# Patient Record
Sex: MALE | Race: WHITE | NOT HISPANIC OR LATINO | Employment: FULL TIME | URBAN - METROPOLITAN AREA
[De-identification: names, ages, dates, MRNs, and addresses within clinical notes are randomized per-mention and may not be internally consistent; named-entity substitution may affect disease eponyms.]

---

## 2023-02-07 PROBLEM — R10.13 EPIGASTRIC PAIN: Status: ACTIVE | Noted: 2023-02-07

## 2023-02-07 PROBLEM — I69.320 APHASIA FOLLOWING CEREBROVASCULAR ACCIDENT (CVA): Status: ACTIVE | Noted: 2023-02-07

## 2023-02-07 PROBLEM — R41.841 COGNITIVE COMMUNICATION DEFICIT: Status: ACTIVE | Noted: 2023-02-07

## 2023-02-07 PROBLEM — R76.8 ANA POSITIVE: Status: ACTIVE | Noted: 2023-02-07

## 2023-02-07 PROBLEM — R41.89 IMPAIRED COGNITION: Status: ACTIVE | Noted: 2023-02-07

## 2023-02-07 PROBLEM — Q21.12 PFO (PATENT FORAMEN OVALE) (HHS-HCC): Status: ACTIVE | Noted: 2023-02-07

## 2023-02-07 PROBLEM — I63.9 CVA (CEREBRAL VASCULAR ACCIDENT) (MULTI): Status: ACTIVE | Noted: 2023-02-07

## 2023-02-07 PROBLEM — R53.1 LACK OF STRENGTH: Status: ACTIVE | Noted: 2023-02-07

## 2023-02-07 RX ORDER — ATORVASTATIN CALCIUM 80 MG/1
1 TABLET, FILM COATED ORAL NIGHTLY
COMMUNITY
Start: 2022-08-08 | End: 2023-09-21 | Stop reason: WASHOUT

## 2023-02-07 RX ORDER — CLOPIDOGREL BISULFATE 75 MG/1
1 TABLET ORAL DAILY
COMMUNITY
Start: 2022-09-20 | End: 2023-09-21 | Stop reason: ALTCHOICE

## 2023-02-07 RX ORDER — NAPROXEN SODIUM 220 MG/1
1 TABLET, FILM COATED ORAL DAILY
COMMUNITY
Start: 2022-08-08

## 2023-03-19 PROBLEM — R41.89 IMPAIRED COGNITION: Status: RESOLVED | Noted: 2023-02-07 | Resolved: 2023-03-19

## 2023-03-19 PROBLEM — R41.841 COGNITIVE COMMUNICATION DEFICIT: Status: RESOLVED | Noted: 2023-02-07 | Resolved: 2023-03-19

## 2023-03-19 PROBLEM — R53.1 LACK OF STRENGTH: Status: RESOLVED | Noted: 2023-02-07 | Resolved: 2023-03-19

## 2023-03-19 NOTE — PATIENT INSTRUCTIONS
Thank you for choosing [facility] for your healthcare.   As always if you have any questions or concerns please do not hesitate to call our office at [phone] or through [employer].    Have a great day!  Lori Xavier, DO

## 2023-03-20 ENCOUNTER — OFFICE VISIT (OUTPATIENT)
Dept: PRIMARY CARE | Facility: CLINIC | Age: 48
End: 2023-03-20
Payer: COMMERCIAL

## 2023-03-20 VITALS
TEMPERATURE: 96.8 F | HEIGHT: 76 IN | SYSTOLIC BLOOD PRESSURE: 135 MMHG | WEIGHT: 182 LBS | BODY MASS INDEX: 22.16 KG/M2 | RESPIRATION RATE: 16 BRPM | HEART RATE: 76 BPM | DIASTOLIC BLOOD PRESSURE: 79 MMHG | OXYGEN SATURATION: 99 %

## 2023-03-20 DIAGNOSIS — I63.81 CEREBROVASCULAR ACCIDENT (CVA) OF LEFT THALAMUS (MULTI): Primary | ICD-10-CM

## 2023-03-20 DIAGNOSIS — Q21.12 PFO (PATENT FORAMEN OVALE) (HHS-HCC): ICD-10-CM

## 2023-03-20 PROBLEM — I69.320 APHASIA FOLLOWING CEREBROVASCULAR ACCIDENT (CVA): Status: RESOLVED | Noted: 2023-02-07 | Resolved: 2023-03-20

## 2023-03-20 PROBLEM — R10.13 EPIGASTRIC PAIN: Status: RESOLVED | Noted: 2023-02-07 | Resolved: 2023-03-20

## 2023-03-20 PROCEDURE — 1036F TOBACCO NON-USER: CPT | Performed by: FAMILY MEDICINE

## 2023-03-20 PROCEDURE — 99213 OFFICE O/P EST LOW 20 MIN: CPT | Performed by: FAMILY MEDICINE

## 2023-03-20 ASSESSMENT — PAIN SCALES - GENERAL: PAINLEVEL: 0-NO PAIN

## 2023-03-20 NOTE — PROGRESS NOTES
"Subjective   Patient ID: Anjel Rodriguez is a 47 y.o. male who presents for Leg Injury (Was bruised now there is a lump no injury noticed/Has appt with Cardiology soon) and Cerebrovascular Accident (8/2022).      In addition to that documented in the HPI above, the additional ROS was obtained:  Constitutional: Denies fevers or chills  Eyes: Denies vision changes  ENMT: Denies trouble swallowing  Cardiovascular: Denies chest pain or heart racing  Respiratory: Denies SOB or cough    Current Outpatient Medications   Medication Sig Dispense Refill    aspirin 81 mg chewable tablet Chew 1 tablet (81 mg) once daily.      atorvastatin (Lipitor) 80 mg tablet Take 1 tablet (80 mg) by mouth once daily at bedtime.      clopidogrel (Plavix) 75 mg tablet Take 1 tablet (75 mg) by mouth once daily.       No current facility-administered medications for this visit.     Past Surgical History:   Procedure Laterality Date    MR HEAD ANGIO WO IV CONTRAST  8/4/2022    MR HEAD ANGIO WO IV CONTRAST 8/4/2022 [facility] CLINICAL LEGACY    MR NECK ANGIO WO IV CONTRAST  8/4/2022    MR NECK ANGIO WO IV CONTRAST 8/4/2022 [facility] CLINICAL LEGACY    OTHER SURGICAL HISTORY  03/07/2019    Circumcision     Family History   Problem Relation Name Age of Onset    Dementia Mother      Colon cancer Father        Social History     Tobacco Use    Smoking status: Never    Smokeless tobacco: Never   Vaping Use    Vaping status: Never Used     Passive vaping exposure: Yes   Substance Use Topics    Alcohol use: Never    Drug use: Never        Objective     Visit Vitals  /79 (BP Location: Right arm, Patient Position: Sitting, BP Cuff Size: Adult)   Pulse 76   Temp 36 °C (96.8 °F) (Temporal)   Resp 16   Ht 1.93 m (6' 4\")   Wt 82.6 kg (182 lb)   SpO2 99%   BMI 22.15 kg/m²   Smoking Status Never   BSA 2.1 m²        Constitutional: Well nourished, well developed, appears stated age  Eyes: no scleral icterus, no conjunctival injection  Ears: normal external auditory " canal, no retraction or bulging of tympanic membranes  Neck: no thyromegaly  Cardiovascular: regular rate and rhythm, no leg edema  Respiratory: normal respiratory effort, clear to auscultation bilaterally  Musculoskeletal: No gross deformities appreciated  Skin: Warm, dry. No rashes. Fading bruise on right inner thigh with small hematoma - discussed warm compress  Neurologic: Alert, CNs II-XII grossly intact..  Psych: Appropriate mood and affect.        Assessment/Plan   Problem List Items Addressed This Visit          Nervous    Cerebrovascular accident (CVA) of left thalamus (CMS/HCC) - Primary     Continue atorvastatin and Plavix         Relevant Orders    Lipid Panel    Comprehensive Metabolic Panel       Circulatory    PFO (patent foramen ovale)     Has follow up with cardiology in the next few weeks            Follow up 6 months for annual physical.    Virginia Factor, DO

## 2023-03-24 PROBLEM — R10.13 EPIGASTRIC PAIN: Status: ACTIVE | Noted: 2023-03-24

## 2023-03-24 PROBLEM — R41.89 IMPAIRED COGNITION: Status: ACTIVE | Noted: 2023-03-24

## 2023-03-24 PROBLEM — R53.1 LACK OF STRENGTH: Status: ACTIVE | Noted: 2023-03-24

## 2023-03-24 PROBLEM — I69.320 APHASIA FOLLOWING CEREBROVASCULAR ACCIDENT (CVA): Status: ACTIVE | Noted: 2023-03-24

## 2023-03-24 PROBLEM — I63.9 CVA (CEREBRAL VASCULAR ACCIDENT) (MULTI): Status: ACTIVE | Noted: 2023-03-24

## 2023-03-24 PROBLEM — R41.841 COGNITIVE COMMUNICATION DEFICIT: Status: ACTIVE | Noted: 2023-03-24

## 2023-08-04 ENCOUNTER — LAB (OUTPATIENT)
Dept: LAB | Facility: LAB | Age: 48
End: 2023-08-04
Payer: COMMERCIAL

## 2023-08-04 DIAGNOSIS — I63.81 CEREBROVASCULAR ACCIDENT (CVA) OF LEFT THALAMUS (MULTI): ICD-10-CM

## 2023-08-04 LAB
ALANINE AMINOTRANSFERASE (SGPT) (U/L) IN SER/PLAS: 15 U/L (ref 10–52)
ALBUMIN (G/DL) IN SER/PLAS: 4.7 G/DL (ref 3.4–5)
ALKALINE PHOSPHATASE (U/L) IN SER/PLAS: 59 U/L (ref 33–120)
ANION GAP IN SER/PLAS: 9 MMOL/L (ref 10–20)
ASPARTATE AMINOTRANSFERASE (SGOT) (U/L) IN SER/PLAS: 14 U/L (ref 9–39)
BILIRUBIN TOTAL (MG/DL) IN SER/PLAS: 1 MG/DL (ref 0–1.2)
CALCIUM (MG/DL) IN SER/PLAS: 9.6 MG/DL (ref 8.6–10.3)
CARBON DIOXIDE, TOTAL (MMOL/L) IN SER/PLAS: 29 MMOL/L (ref 21–32)
CHLORIDE (MMOL/L) IN SER/PLAS: 106 MMOL/L (ref 98–107)
CHOLESTEROL (MG/DL) IN SER/PLAS: 175 MG/DL (ref 0–199)
CHOLESTEROL IN HDL (MG/DL) IN SER/PLAS: 59.6 MG/DL
CHOLESTEROL/HDL RATIO: 2.9
CREATININE (MG/DL) IN SER/PLAS: 1.22 MG/DL (ref 0.5–1.3)
GFR MALE: 74 ML/MIN/1.73M2
GLUCOSE (MG/DL) IN SER/PLAS: 90 MG/DL (ref 74–99)
LDL: 104 MG/DL (ref 0–99)
POTASSIUM (MMOL/L) IN SER/PLAS: 4.8 MMOL/L (ref 3.5–5.3)
PROTEIN TOTAL: 7.1 G/DL (ref 6.4–8.2)
SODIUM (MMOL/L) IN SER/PLAS: 139 MMOL/L (ref 136–145)
TRIGLYCERIDE (MG/DL) IN SER/PLAS: 59 MG/DL (ref 0–149)
UREA NITROGEN (MG/DL) IN SER/PLAS: 16 MG/DL (ref 6–23)
VLDL: 12 MG/DL (ref 0–40)

## 2023-08-04 PROCEDURE — 80053 COMPREHEN METABOLIC PANEL: CPT

## 2023-08-04 PROCEDURE — 36415 COLL VENOUS BLD VENIPUNCTURE: CPT

## 2023-08-04 PROCEDURE — 80061 LIPID PANEL: CPT

## 2023-09-21 ENCOUNTER — OFFICE VISIT (OUTPATIENT)
Dept: PRIMARY CARE | Facility: CLINIC | Age: 48
End: 2023-09-21
Payer: COMMERCIAL

## 2023-09-21 VITALS
BODY MASS INDEX: 20.95 KG/M2 | SYSTOLIC BLOOD PRESSURE: 116 MMHG | WEIGHT: 172 LBS | RESPIRATION RATE: 16 BRPM | TEMPERATURE: 97.6 F | OXYGEN SATURATION: 98 % | HEART RATE: 61 BPM | DIASTOLIC BLOOD PRESSURE: 77 MMHG | HEIGHT: 76 IN

## 2023-09-21 DIAGNOSIS — Z00.00 ANNUAL PHYSICAL EXAM: Primary | ICD-10-CM

## 2023-09-21 DIAGNOSIS — Z12.11 SCREENING FOR COLON CANCER: ICD-10-CM

## 2023-09-21 PROBLEM — I63.9 CVA (CEREBRAL VASCULAR ACCIDENT) (MULTI): Status: RESOLVED | Noted: 2023-03-24 | Resolved: 2023-09-21

## 2023-09-21 PROBLEM — R41.89 IMPAIRED COGNITION: Status: RESOLVED | Noted: 2023-03-24 | Resolved: 2023-09-21

## 2023-09-21 PROBLEM — R41.841 COGNITIVE COMMUNICATION DEFICIT: Status: RESOLVED | Noted: 2023-03-24 | Resolved: 2023-09-21

## 2023-09-21 PROBLEM — Q21.12 PFO (PATENT FORAMEN OVALE) (HHS-HCC): Status: RESOLVED | Noted: 2023-02-07 | Resolved: 2023-09-21

## 2023-09-21 PROBLEM — R10.13 EPIGASTRIC PAIN: Status: RESOLVED | Noted: 2023-03-24 | Resolved: 2023-09-21

## 2023-09-21 PROBLEM — R53.1 LACK OF STRENGTH: Status: RESOLVED | Noted: 2023-03-24 | Resolved: 2023-09-21

## 2023-09-21 PROBLEM — I69.320 APHASIA FOLLOWING CEREBROVASCULAR ACCIDENT (CVA): Status: RESOLVED | Noted: 2023-03-24 | Resolved: 2023-09-21

## 2023-09-21 PROCEDURE — 1036F TOBACCO NON-USER: CPT | Performed by: FAMILY MEDICINE

## 2023-09-21 PROCEDURE — 99396 PREV VISIT EST AGE 40-64: CPT | Performed by: FAMILY MEDICINE

## 2023-09-21 SDOH — ECONOMIC STABILITY: FOOD INSECURITY: WITHIN THE PAST 12 MONTHS, YOU WORRIED THAT YOUR FOOD WOULD RUN OUT BEFORE YOU GOT MONEY TO BUY MORE.: NEVER TRUE

## 2023-09-21 SDOH — ECONOMIC STABILITY: FOOD INSECURITY: WITHIN THE PAST 12 MONTHS, THE FOOD YOU BOUGHT JUST DIDN'T LAST AND YOU DIDN'T HAVE MONEY TO GET MORE.: NEVER TRUE

## 2023-09-21 ASSESSMENT — PATIENT HEALTH QUESTIONNAIRE - PHQ9
SUM OF ALL RESPONSES TO PHQ9 QUESTIONS 1 & 2: 0
1. LITTLE INTEREST OR PLEASURE IN DOING THINGS: NOT AT ALL
2. FEELING DOWN, DEPRESSED OR HOPELESS: NOT AT ALL

## 2023-09-21 ASSESSMENT — LIFESTYLE VARIABLES
HOW OFTEN DO YOU HAVE A DRINK CONTAINING ALCOHOL: NEVER
HOW MANY STANDARD DRINKS CONTAINING ALCOHOL DO YOU HAVE ON A TYPICAL DAY: PATIENT DOES NOT DRINK
SKIP TO QUESTIONS 9-10: 1
AUDIT-C TOTAL SCORE: 0
HOW OFTEN DO YOU HAVE SIX OR MORE DRINKS ON ONE OCCASION: NEVER

## 2023-09-21 NOTE — PROGRESS NOTES
"Subjective   Patient ID: Anjel Rodriguez is a 47 y.o. male who presents for Follow-up (No new health concerns. Would like to go over lab work he had done).  HPI    In addition to that documented in the HPI above, the additional ROS was obtained:  Constitutional: Denies fevers or chills  Eyes: Denies vision changes  ENMT: Denies trouble swallowing  Cardiovascular: Denies chest pain or heart racing  Respiratory: Denies SOB or cough  Gastrointestinal: Denies nausea, vomiting, diarrhea or constipation  Musculoskeletal: Denies joint pain or joint swelling  Neuro: denies frequent headaches or dizziness    Current Outpatient Medications   Medication Sig Dispense Refill    aspirin 81 mg chewable tablet Chew 1 tablet (81 mg) once daily.       No current facility-administered medications for this visit.       Objective     Visit Vitals  /77 (BP Location: Right arm, Patient Position: Sitting, BP Cuff Size: Adult)   Pulse 61   Temp 36.4 °C (97.6 °F)   Resp 16   Ht 1.93 m (6' 4\")   Wt 78 kg (172 lb)   SpO2 98%   BMI 20.94 kg/m²   Smoking Status Never   BSA 2.04 m²        Constitutional: Well nourished, well developed, appears stated age  Eyes: no scleral icterus, no conjunctival injection  Ears: normal external auditory canal, no retraction or bulging of tympanic membranes  Neck: no thyromegaly  Cardiovascular: regular rate and rhythm, no leg edema  Respiratory: normal respiratory effort, clear to auscultation bilaterally  Musculoskeletal: No gross deformities appreciated  Skin: Warm, dry. No rashes  Neurologic: Alert, CNs II-XII grossly intact..  Psych: Appropriate mood and affect.        Assessment/Plan   Problem List Items Addressed This Visit    None  Visit Diagnoses       Annual physical exam    -  Primary    Screening labs reviewed with patient  Screening colonoscopy ordered    Screening for colon cancer        Relevant Orders    Colonoscopy Screening          All pertinent lab work and results were reviewed with " patient.     Follow up yearly and prn.    Lori Xavier DO

## 2023-09-21 NOTE — PATIENT INSTRUCTIONS
Thank you for choosing [facility] for your healthcare.   As always if you have any questions or concerns please do not hesitate to call our office at [phone] or through [employer].    Have a great day!  Lori Xavier, DO

## 2023-11-13 ENCOUNTER — PREP FOR PROCEDURE (OUTPATIENT)
Dept: GASTROENTEROLOGY | Facility: HOSPITAL | Age: 48
End: 2023-11-13
Payer: COMMERCIAL

## 2023-11-18 ENCOUNTER — PREP FOR PROCEDURE (OUTPATIENT)
Dept: GASTROENTEROLOGY | Facility: CLINIC | Age: 48
End: 2023-11-18
Payer: COMMERCIAL

## 2023-11-18 RX ORDER — SODIUM CHLORIDE 9 MG/ML
20 INJECTION, SOLUTION INTRAVENOUS CONTINUOUS
Status: CANCELLED | OUTPATIENT
Start: 2023-11-18

## 2023-11-20 ENCOUNTER — ANESTHESIA (OUTPATIENT)
Dept: GASTROENTEROLOGY | Facility: HOSPITAL | Age: 48
End: 2023-11-20
Payer: COMMERCIAL

## 2023-11-20 ENCOUNTER — ANESTHESIA EVENT (OUTPATIENT)
Dept: GASTROENTEROLOGY | Facility: HOSPITAL | Age: 48
End: 2023-11-20
Payer: COMMERCIAL

## 2023-11-20 ENCOUNTER — HOSPITAL ENCOUNTER (OUTPATIENT)
Dept: GASTROENTEROLOGY | Facility: HOSPITAL | Age: 48
Discharge: HOME | End: 2023-11-20
Payer: COMMERCIAL

## 2023-11-20 VITALS
HEIGHT: 76 IN | TEMPERATURE: 97.9 F | WEIGHT: 175 LBS | SYSTOLIC BLOOD PRESSURE: 114 MMHG | BODY MASS INDEX: 21.31 KG/M2 | DIASTOLIC BLOOD PRESSURE: 71 MMHG | HEART RATE: 52 BPM | RESPIRATION RATE: 16 BRPM | OXYGEN SATURATION: 100 %

## 2023-11-20 DIAGNOSIS — Z12.11 SCREENING FOR COLON CANCER: ICD-10-CM

## 2023-11-20 PROCEDURE — 7100000009 HC PHASE TWO TIME - INITIAL BASE CHARGE: Performed by: INTERNAL MEDICINE

## 2023-11-20 PROCEDURE — 3700000001 HC GENERAL ANESTHESIA TIME - INITIAL BASE CHARGE: Performed by: INTERNAL MEDICINE

## 2023-11-20 PROCEDURE — G0121 COLON CA SCRN NOT HI RSK IND: HCPCS | Performed by: INTERNAL MEDICINE

## 2023-11-20 PROCEDURE — 45378 DIAGNOSTIC COLONOSCOPY: CPT | Performed by: INTERNAL MEDICINE

## 2023-11-20 PROCEDURE — 2500000005 HC RX 250 GENERAL PHARMACY W/O HCPCS: Performed by: NURSE ANESTHETIST, CERTIFIED REGISTERED

## 2023-11-20 PROCEDURE — 7100000010 HC PHASE TWO TIME - EACH INCREMENTAL 1 MINUTE: Performed by: INTERNAL MEDICINE

## 2023-11-20 PROCEDURE — 3700000002 HC GENERAL ANESTHESIA TIME - EACH INCREMENTAL 1 MINUTE: Performed by: INTERNAL MEDICINE

## 2023-11-20 PROCEDURE — 2500000004 HC RX 250 GENERAL PHARMACY W/ HCPCS (ALT 636 FOR OP/ED): Performed by: INTERNAL MEDICINE

## 2023-11-20 PROCEDURE — 2500000004 HC RX 250 GENERAL PHARMACY W/ HCPCS (ALT 636 FOR OP/ED): Performed by: NURSE ANESTHETIST, CERTIFIED REGISTERED

## 2023-11-20 RX ORDER — FENTANYL CITRATE 50 UG/ML
INJECTION, SOLUTION INTRAMUSCULAR; INTRAVENOUS AS NEEDED
Status: DISCONTINUED | OUTPATIENT
Start: 2023-11-20 | End: 2023-11-20

## 2023-11-20 RX ORDER — LIDOCAINE HYDROCHLORIDE 20 MG/ML
INJECTION, SOLUTION INFILTRATION; PERINEURAL AS NEEDED
Status: DISCONTINUED | OUTPATIENT
Start: 2023-11-20 | End: 2023-11-20

## 2023-11-20 RX ORDER — PROPOFOL 10 MG/ML
INJECTION, EMULSION INTRAVENOUS AS NEEDED
Status: DISCONTINUED | OUTPATIENT
Start: 2023-11-20 | End: 2023-11-20

## 2023-11-20 RX ORDER — SODIUM CHLORIDE 9 MG/ML
20 INJECTION, SOLUTION INTRAVENOUS CONTINUOUS
Status: DISCONTINUED | OUTPATIENT
Start: 2023-11-20 | End: 2023-11-21 | Stop reason: HOSPADM

## 2023-11-20 RX ADMIN — PROPOFOL 50 MG: 10 INJECTION, EMULSION INTRAVENOUS at 13:40

## 2023-11-20 RX ADMIN — PROPOFOL 50 MG: 10 INJECTION, EMULSION INTRAVENOUS at 13:30

## 2023-11-20 RX ADMIN — FENTANYL CITRATE 25 MCG: 50 INJECTION, SOLUTION INTRAMUSCULAR; INTRAVENOUS at 13:30

## 2023-11-20 RX ADMIN — PROPOFOL 50 MG: 10 INJECTION, EMULSION INTRAVENOUS at 13:35

## 2023-11-20 RX ADMIN — PROPOFOL 100 MG: 10 INJECTION, EMULSION INTRAVENOUS at 13:26

## 2023-11-20 RX ADMIN — SODIUM CHLORIDE 20 ML/HR: 9 INJECTION, SOLUTION INTRAVENOUS at 13:15

## 2023-11-20 RX ADMIN — LIDOCAINE HYDROCHLORIDE 4 ML: 20 INJECTION, SOLUTION INFILTRATION; PERINEURAL at 13:26

## 2023-11-20 RX ADMIN — FENTANYL CITRATE 25 MCG: 50 INJECTION, SOLUTION INTRAMUSCULAR; INTRAVENOUS at 13:35

## 2023-11-20 RX ADMIN — FENTANYL CITRATE 50 MCG: 50 INJECTION, SOLUTION INTRAMUSCULAR; INTRAVENOUS at 13:26

## 2023-11-20 SDOH — HEALTH STABILITY: MENTAL HEALTH: CURRENT SMOKER: 0

## 2023-11-20 ASSESSMENT — PAIN SCALES - GENERAL
PAINLEVEL_OUTOF10: 0 - NO PAIN
PAIN_LEVEL: 0

## 2023-11-20 ASSESSMENT — COLUMBIA-SUICIDE SEVERITY RATING SCALE - C-SSRS
2. HAVE YOU ACTUALLY HAD ANY THOUGHTS OF KILLING YOURSELF?: NO
6. HAVE YOU EVER DONE ANYTHING, STARTED TO DO ANYTHING, OR PREPARED TO DO ANYTHING TO END YOUR LIFE?: NO
1. IN THE PAST MONTH, HAVE YOU WISHED YOU WERE DEAD OR WISHED YOU COULD GO TO SLEEP AND NOT WAKE UP?: NO

## 2023-11-20 ASSESSMENT — PAIN - FUNCTIONAL ASSESSMENT: PAIN_FUNCTIONAL_ASSESSMENT: 0-10

## 2023-11-20 NOTE — ANESTHESIA POSTPROCEDURE EVALUATION
Patient: Anjel Rodriguez    Procedure Summary       Date: 11/20/23 Room / Location: [facility]    Anesthesia Start: 1320 Anesthesia Stop: 1352    Procedure: COLONOSCOPY Diagnosis: Screening for colon cancer    Scheduled Providers: Travis Vazquez MD Responsible Provider: SOTO Meza    Anesthesia Type: MAC ASA Status: 2            Anesthesia Type: MAC    Vitals Value Taken Time   /63 11/20/23 1352   Temp 36.3 °C (97.3 °F) 11/20/23 1352   Pulse 47 11/20/23 1352   Resp 16 11/20/23 1352   SpO2 98 % 11/20/23 1352       Anesthesia Post Evaluation    Patient location during evaluation: bedside  Patient participation: complete - patient participated  Level of consciousness: awake and alert  Pain score: 0  Pain management: adequate  Airway patency: patent  Cardiovascular status: acceptable  Respiratory status: acceptable  Hydration status: acceptable  Postoperative Nausea and Vomiting: none        There were no known notable events for this encounter.

## 2023-11-20 NOTE — ANESTHESIA PREPROCEDURE EVALUATION
Patient: Anjel Rodriguez    Procedure Information       Date/Time: 11/20/23 1400    Scheduled providers: Travis Vazquez MD    Procedure: COLONOSCOPY    Location:  [facility]            Relevant Problems   Anesthesia (within normal limits)      Cardiovascular  PFO repair 1 year ago      Endocrine (within normal limits)      GI (within normal limits)      /Renal (within normal limits)      Neuro/Psych   (+) Cerebrovascular accident (CVA) of left thalamus (CMS/HCC)      Pulmonary (within normal limits)      GI/Hepatic (within normal limits)      Hematology (within normal limits)      Musculoskeletal (within normal limits)       Clinical information reviewed:   Tobacco  Allergies  Meds   Med Hx  Surg Hx   Fam Hx  Soc Hx        NPO Detail:  NPO/Void Status  Date of Last Liquid: 11/20/23  Time of Last Liquid: 0930  Date of Last Solid: 11/18/23  Time of Last Solid: 2000  Last Intake Type: Clear fluids         Physical Exam    Airway  Mallampati: II  TM distance: >3 FB  Neck ROM: full     Cardiovascular - normal exam  Rhythm: regular  Rate: normal     Dental - normal exam     Pulmonary - normal exam     Abdominal - normal exam             Anesthesia Plan    ASA 2     MAC     The patient is not a current smoker.    intravenous induction   Anesthetic plan and risks discussed with patient.

## 2023-11-21 NOTE — ADDENDUM NOTE
Encounter addended by: Lacie Lin RN on: 11/21/2023 12:41 PM   Actions taken: Contacts section saved, Flowsheet accepted

## 2024-02-09 ENCOUNTER — LAB (OUTPATIENT)
Dept: LAB | Facility: LAB | Age: 49
End: 2024-02-09
Payer: COMMERCIAL

## 2024-02-09 ENCOUNTER — OFFICE VISIT (OUTPATIENT)
Dept: URGENT CARE | Facility: CLINIC | Age: 49
End: 2024-02-09
Payer: COMMERCIAL

## 2024-02-09 VITALS
DIASTOLIC BLOOD PRESSURE: 86 MMHG | HEIGHT: 76 IN | TEMPERATURE: 97.6 F | SYSTOLIC BLOOD PRESSURE: 134 MMHG | WEIGHT: 160.2 LBS | OXYGEN SATURATION: 97 % | HEART RATE: 65 BPM | BODY MASS INDEX: 19.51 KG/M2

## 2024-02-09 DIAGNOSIS — F32.A ANXIETY AND DEPRESSION: Primary | ICD-10-CM

## 2024-02-09 DIAGNOSIS — F32.A ANXIETY AND DEPRESSION: ICD-10-CM

## 2024-02-09 DIAGNOSIS — R53.83 OTHER FATIGUE: ICD-10-CM

## 2024-02-09 DIAGNOSIS — F41.9 ANXIETY AND DEPRESSION: ICD-10-CM

## 2024-02-09 DIAGNOSIS — F41.9 ANXIETY AND DEPRESSION: Primary | ICD-10-CM

## 2024-02-09 LAB
ALBUMIN SERPL BCP-MCNC: 4.9 G/DL (ref 3.4–5)
ALP SERPL-CCNC: 57 U/L (ref 33–120)
ALT SERPL W P-5'-P-CCNC: 22 U/L (ref 10–52)
ANION GAP SERPL CALC-SCNC: 9 MMOL/L (ref 10–20)
AST SERPL W P-5'-P-CCNC: 15 U/L (ref 9–39)
BASOPHILS # BLD AUTO: 0.04 X10*3/UL (ref 0–0.1)
BASOPHILS NFR BLD AUTO: 0.7 %
BILIRUB SERPL-MCNC: 1.3 MG/DL (ref 0–1.2)
BUN SERPL-MCNC: 14 MG/DL (ref 6–23)
CALCIUM SERPL-MCNC: 9.5 MG/DL (ref 8.6–10.3)
CHLORIDE SERPL-SCNC: 106 MMOL/L (ref 98–107)
CO2 SERPL-SCNC: 28 MMOL/L (ref 21–32)
CREAT SERPL-MCNC: 1.13 MG/DL (ref 0.5–1.3)
EGFRCR SERPLBLD CKD-EPI 2021: 81 ML/MIN/1.73M*2
EOSINOPHIL # BLD AUTO: 0.02 X10*3/UL (ref 0–0.7)
EOSINOPHIL NFR BLD AUTO: 0.4 %
ERYTHROCYTE [DISTWIDTH] IN BLOOD BY AUTOMATED COUNT: 12.8 % (ref 11.5–14.5)
GLUCOSE SERPL-MCNC: 93 MG/DL (ref 74–99)
HCT VFR BLD AUTO: 46.2 % (ref 41–52)
HGB BLD-MCNC: 15.3 G/DL (ref 13.5–17.5)
IMM GRANULOCYTES # BLD AUTO: 0.01 X10*3/UL (ref 0–0.7)
IMM GRANULOCYTES NFR BLD AUTO: 0.2 % (ref 0–0.9)
IRON SATN MFR SERPL: 29 % (ref 25–45)
IRON SERPL-MCNC: 111 UG/DL (ref 35–150)
LYMPHOCYTES # BLD AUTO: 2.25 X10*3/UL (ref 1.2–4.8)
LYMPHOCYTES NFR BLD AUTO: 40.5 %
MCH RBC QN AUTO: 30.1 PG (ref 26–34)
MCHC RBC AUTO-ENTMCNC: 33.1 G/DL (ref 32–36)
MCV RBC AUTO: 91 FL (ref 80–100)
MONOCYTES # BLD AUTO: 0.42 X10*3/UL (ref 0.1–1)
MONOCYTES NFR BLD AUTO: 7.6 %
NEUTROPHILS # BLD AUTO: 2.81 X10*3/UL (ref 1.2–7.7)
NEUTROPHILS NFR BLD AUTO: 50.6 %
NRBC BLD-RTO: 0 /100 WBCS (ref 0–0)
PLATELET # BLD AUTO: 258 X10*3/UL (ref 150–450)
POTASSIUM SERPL-SCNC: 4.7 MMOL/L (ref 3.5–5.3)
PROT SERPL-MCNC: 7.4 G/DL (ref 6.4–8.2)
RBC # BLD AUTO: 5.09 X10*6/UL (ref 4.5–5.9)
SODIUM SERPL-SCNC: 138 MMOL/L (ref 136–145)
TIBC SERPL-MCNC: 380 UG/DL (ref 240–445)
TSH SERPL-ACNC: 2.61 MIU/L (ref 0.44–3.98)
UIBC SERPL-MCNC: 269 UG/DL (ref 110–370)
VIT B12 SERPL-MCNC: 471 PG/ML (ref 211–911)
WBC # BLD AUTO: 5.6 X10*3/UL (ref 4.4–11.3)

## 2024-02-09 PROCEDURE — 84443 ASSAY THYROID STIM HORMONE: CPT

## 2024-02-09 PROCEDURE — 1036F TOBACCO NON-USER: CPT

## 2024-02-09 PROCEDURE — 36415 COLL VENOUS BLD VENIPUNCTURE: CPT

## 2024-02-09 PROCEDURE — 82607 VITAMIN B-12: CPT

## 2024-02-09 PROCEDURE — 80053 COMPREHEN METABOLIC PANEL: CPT

## 2024-02-09 PROCEDURE — 99203 OFFICE O/P NEW LOW 30 MIN: CPT

## 2024-02-09 PROCEDURE — 83540 ASSAY OF IRON: CPT

## 2024-02-09 PROCEDURE — 85025 COMPLETE CBC W/AUTO DIFF WBC: CPT

## 2024-02-09 PROCEDURE — 83550 IRON BINDING TEST: CPT

## 2024-02-09 RX ORDER — HYDROXYZINE PAMOATE 25 MG/1
25 CAPSULE ORAL 3 TIMES DAILY PRN
Qty: 45 CAPSULE | Refills: 0 | Status: SHIPPED | OUTPATIENT
Start: 2024-02-09 | End: 2024-02-13 | Stop reason: SDUPTHER

## 2024-02-09 ASSESSMENT — ENCOUNTER SYMPTOMS
FEVER: 0
POLYDIPSIA: 0
CHEST TIGHTNESS: 0
APPETITE CHANGE: 1
MYALGIAS: 0
ARTHRALGIAS: 0
SHORTNESS OF BREATH: 0
ABDOMINAL PAIN: 0
CHILLS: 0
DIZZINESS: 0
AGITATION: 0
CONSTIPATION: 0
NAUSEA: 0
MUSCULOSKELETAL NEGATIVE: 1
HEADACHES: 0
UNEXPECTED WEIGHT CHANGE: 1
NERVOUS/ANXIOUS: 1
DIARRHEA: 0
DYSPHORIC MOOD: 1
PALPITATIONS: 1
NUMBNESS: 0
GASTROINTESTINAL NEGATIVE: 1
POLYPHAGIA: 0
RHINORRHEA: 0
FATIGUE: 1
DECREASED CONCENTRATION: 0
RESPIRATORY NEGATIVE: 1
SLEEP DISTURBANCE: 0
SORE THROAT: 0
DIAPHORESIS: 0
LOSS OF CONSCIOUSNESS: 0
COUGH: 0
WEAKNESS: 1
VOMITING: 0

## 2024-02-09 NOTE — LETTER
February 9, 2024     Patient: Anjel Rodriguez   YOB: 1975   Date of Visit: 2/9/2024       To Whom It May Concern:    Anjel Rodriguez was seen in my clinic on 2/9/2024 at 9:20 am. Please excuse Anjel for his absence from work on this day to make the appointment.    If you have any questions or concerns, please don't hesitate to call.         Sincerely,         Rossy Andrea PA-C        CC: No Recipients

## 2024-02-09 NOTE — PROGRESS NOTES
"Subjective     Anjel Rodriguez is a 48 y.o. male who presents for Anxiety.    Patient presents with feelings of anxiety, depression, weight loss, decreased appetite, and weakness for the last 1-2 months. He reports recent changes in his work in the last couple months. He reports never having anxiety or depression or being medically treated for either. He reports having a stroke 2 years ago causing lingering \"slowness\" and fatigue. He denies any thoughts of self harm.      History provided by:  Patient and medical records   used: No    Anxiety  Onset quality:  Gradual  Timing:  Constant  Progression:  Worsening  Chronicity:  New  Associated symptoms: fatigue    Associated symptoms: no abdominal pain, no chest pain, no congestion, no cough, no diarrhea, no fever, no headaches, no loss of consciousness, no myalgias, no nausea, no rash, no rhinorrhea, no shortness of breath, no sore throat and no vomiting        /86 (BP Location: Right arm, Patient Position: Sitting, BP Cuff Size: Small adult)   Pulse 65   Temp 36.4 °C (97.6 °F) (Oral)   Ht 1.93 m (6' 4\")   Wt 72.7 kg (160 lb 3.2 oz)   SpO2 97%   BMI 19.50 kg/m²    All vitals have been reviewed and are stable.    Review of Systems   Constitutional:  Positive for appetite change, fatigue and unexpected weight change. Negative for chills, diaphoresis and fever.   HENT:  Negative for congestion, rhinorrhea and sore throat.    Respiratory: Negative.  Negative for cough, chest tightness and shortness of breath.    Cardiovascular:  Positive for palpitations. Negative for chest pain and leg swelling.   Gastrointestinal: Negative.  Negative for abdominal pain, constipation, diarrhea, nausea and vomiting.   Endocrine: Positive for cold intolerance. Negative for polydipsia, polyphagia and polyuria.   Musculoskeletal: Negative.  Negative for arthralgias and myalgias.   Skin: Negative.  Negative for rash.   Neurological:  Positive for weakness. " Negative for dizziness, loss of consciousness, syncope, numbness and headaches.   Psychiatric/Behavioral:  Positive for dysphoric mood. Negative for agitation, behavioral problems, decreased concentration, self-injury, sleep disturbance and suicidal ideas. The patient is nervous/anxious.        Objective   Physical Exam  Vitals and nursing note reviewed.   Constitutional:       General: He is awake. He is not in acute distress.     Appearance: Normal appearance. He is well-developed.   HENT:      Head: Normocephalic and atraumatic.      Nose: Nose normal.      Mouth/Throat:      Lips: Pink.      Mouth: Mucous membranes are moist.      Pharynx: Oropharynx is clear.   Eyes:      Extraocular Movements: Extraocular movements intact.      Conjunctiva/sclera: Conjunctivae normal.      Pupils: Pupils are equal, round, and reactive to light.   Neck:      Thyroid: No thyroid mass or thyroid tenderness.   Cardiovascular:      Rate and Rhythm: Normal rate and regular rhythm.      Heart sounds: Normal heart sounds, S1 normal and S2 normal.   Pulmonary:      Effort: Pulmonary effort is normal. No respiratory distress.   Abdominal:      General: Abdomen is flat. There is no distension.   Musculoskeletal:         General: No swelling or deformity. Normal range of motion.      Cervical back: Normal range of motion and neck supple. No tenderness. No pain with movement. Normal range of motion.   Skin:     General: Skin is warm and dry.   Neurological:      General: No focal deficit present.      Mental Status: He is alert and oriented to person, place, and time. Mental status is at baseline.      Motor: Motor function is intact.      Coordination: Coordination is intact.   Psychiatric:         Attention and Perception: Attention normal.         Mood and Affect: Mood is anxious and depressed. Affect is blunt.         Speech: Speech normal.         Behavior: Behavior normal. Behavior is cooperative.         Thought Content: Thought  content normal. Thought content is not paranoid. Thought content does not include homicidal or suicidal ideation. Thought content does not include suicidal plan.         Cognition and Memory: Cognition and memory normal.         Judgment: Judgment normal. Judgment is not impulsive or inappropriate.         Assessment/Plan   Problem List Items Addressed This Visit    None  Visit Diagnoses       Anxiety and depression    -  Primary    Relevant Medications    hydrOXYzine pamoate (VistariL) 25 mg capsule    Other Relevant Orders    CBC and Auto Differential    Comprehensive metabolic panel    TSH with reflex to Free T4 if abnormal    Iron and TIBC    Vitamin B12    Other fatigue        Relevant Orders    CBC and Auto Differential    Comprehensive metabolic panel    TSH with reflex to Free T4 if abnormal    Iron and TIBC    Vitamin B12            Red flags for reporting to ER have been reviewed with the patient.    Current diagnosis, any medication changes, and all in-office lab or radiologic results have been reviewed with the patient at the time of the visit.   If symptoms do not improve or worsen, patient is to follow up with PCP or report to the emergency room.   Patient is alert and oriented x3 and non-toxic appearing. Vital signs are stable.   Patient and/or guardian has sufficient decision-making capabilities at this time and reports understanding and agreement with the treatment plan made through shared decision-making.

## 2024-02-13 ENCOUNTER — TELEPHONE (OUTPATIENT)
Dept: PRIMARY CARE | Facility: CLINIC | Age: 49
End: 2024-02-13
Payer: COMMERCIAL

## 2024-02-13 DIAGNOSIS — F32.A ANXIETY AND DEPRESSION: ICD-10-CM

## 2024-02-13 DIAGNOSIS — F41.1 GENERALIZED ANXIETY DISORDER: Primary | ICD-10-CM

## 2024-02-13 DIAGNOSIS — F41.9 ANXIETY AND DEPRESSION: ICD-10-CM

## 2024-02-13 RX ORDER — HYDROXYZINE PAMOATE 25 MG/1
25 CAPSULE ORAL 3 TIMES DAILY PRN
Qty: 90 CAPSULE | Refills: 1 | Status: SHIPPED | OUTPATIENT
Start: 2024-02-13 | End: 2024-03-18

## 2024-02-13 NOTE — TELEPHONE ENCOUNTER
Patient called in stating that he was in the urgent care last Friday for anxiety and was prescribed a medication. Patient would like to know if Dr. Xavier does the refill on it ? Please advise.

## 2024-02-14 ENCOUNTER — TELEPHONE (OUTPATIENT)
Dept: PRIMARY CARE | Facility: CLINIC | Age: 49
End: 2024-02-14
Payer: COMMERCIAL

## 2024-02-14 NOTE — TELEPHONE ENCOUNTER
Pt was wanting to discuss anxiety concerns. Pt is preferring to talk only with you. Unfortunately, we don't have any appointments in the office until April with another provider and your first sick visit is 9/5/2024. Is there something that you prefer for patient to do?

## 2024-02-14 NOTE — TELEPHONE ENCOUNTER
Called patient, notified of message, patient expressed verbal understanding had no questions at this time. Patient took the spot and I put him on the waitlist.

## 2024-02-14 NOTE — TELEPHONE ENCOUNTER
On 4/8 - the 8 AM slot (initials JS) is an MM patient that was put on my schedule for yearly physical instead of with MM. I assume that was done accidentally. Can you reschedule that appointment and offer that slot to Anjel. Please also add him to wait list in case something comes up sooner.   Thanks,  Lori Xavier, DO

## 2024-03-17 DIAGNOSIS — F41.1 GENERALIZED ANXIETY DISORDER: ICD-10-CM

## 2024-03-18 RX ORDER — HYDROXYZINE PAMOATE 25 MG/1
25 CAPSULE ORAL 3 TIMES DAILY PRN
Qty: 270 CAPSULE | Refills: 1 | Status: SHIPPED | OUTPATIENT
Start: 2024-03-18 | End: 2024-04-08 | Stop reason: WASHOUT

## 2024-04-08 ENCOUNTER — OFFICE VISIT (OUTPATIENT)
Dept: PRIMARY CARE | Facility: CLINIC | Age: 49
End: 2024-04-08
Payer: COMMERCIAL

## 2024-04-08 VITALS
OXYGEN SATURATION: 100 % | WEIGHT: 175 LBS | BODY MASS INDEX: 21.31 KG/M2 | HEIGHT: 76 IN | DIASTOLIC BLOOD PRESSURE: 80 MMHG | RESPIRATION RATE: 16 BRPM | HEART RATE: 74 BPM | SYSTOLIC BLOOD PRESSURE: 126 MMHG | TEMPERATURE: 96.8 F

## 2024-04-08 DIAGNOSIS — F41.1 GENERALIZED ANXIETY DISORDER: Primary | ICD-10-CM

## 2024-04-08 DIAGNOSIS — Z13.1 SCREENING FOR DIABETES MELLITUS: ICD-10-CM

## 2024-04-08 DIAGNOSIS — Z13.220 SCREENING FOR CHOLESTEROL LEVEL: ICD-10-CM

## 2024-04-08 PROCEDURE — 99212 OFFICE O/P EST SF 10 MIN: CPT | Performed by: FAMILY MEDICINE

## 2024-04-08 PROCEDURE — 1036F TOBACCO NON-USER: CPT | Performed by: FAMILY MEDICINE

## 2024-04-08 SDOH — ECONOMIC STABILITY: FOOD INSECURITY: WITHIN THE PAST 12 MONTHS, THE FOOD YOU BOUGHT JUST DIDN'T LAST AND YOU DIDN'T HAVE MONEY TO GET MORE.: NEVER TRUE

## 2024-04-08 SDOH — ECONOMIC STABILITY: FOOD INSECURITY: WITHIN THE PAST 12 MONTHS, YOU WORRIED THAT YOUR FOOD WOULD RUN OUT BEFORE YOU GOT MONEY TO BUY MORE.: NEVER TRUE

## 2024-04-08 ASSESSMENT — PAIN SCALES - GENERAL: PAINLEVEL: 0-NO PAIN

## 2024-04-08 ASSESSMENT — LIFESTYLE VARIABLES
SKIP TO QUESTIONS 9-10: 1
HOW MANY STANDARD DRINKS CONTAINING ALCOHOL DO YOU HAVE ON A TYPICAL DAY: PATIENT DOES NOT DRINK
AUDIT-C TOTAL SCORE: 0
HOW OFTEN DO YOU HAVE A DRINK CONTAINING ALCOHOL: NEVER
HOW OFTEN DO YOU HAVE SIX OR MORE DRINKS ON ONE OCCASION: NEVER

## 2024-04-08 NOTE — PROGRESS NOTES
"Subjective   Patient ID: Anjel Rodriguez is a 48 y.o. male who presents for Anxiety.  He made this appointment about 2 months ago due to he was having problems with anxiety. He went to urgent care and they prescribed him hydroxyzine. I had refilled this for him. He was able to realize that the changes in his job was causing stress which was the underlying reason for the anxiety. He was able to switch to a completely different position and his anxiety has since resolved. He would like to discontinue hydroxyzine.    No other problems or concerns.         Patient Care Team:  Lori Xavier DO as PCP - General (Family Medicine)  Lori Xavier DO as PCP - Employee ACO PCP  Gilse Daily MD as Consulting Physician (Interventional Cardiology)  Demond Buckner MD as Consulting Physician (Rheumatology)  Amaris Fernandez MD as Consulting Physician (Neurology)    Current Outpatient Medications   Medication Sig Dispense Refill    aspirin 81 mg chewable tablet Chew 1 tablet (81 mg) once daily.       No current facility-administered medications for this visit.       Objective     Visit Vitals  /80 (BP Location: Left arm, Patient Position: Sitting, BP Cuff Size: Adult)   Pulse 74   Temp 36 °C (96.8 °F) (Temporal)   Resp 16   Ht 1.93 m (6' 4\")   Wt 79.4 kg (175 lb)   SpO2 100%   BMI 21.30 kg/m²   Smoking Status Never   BSA 2.06 m²        Constitutional: Well nourished, well developed, appears stated age  Eyes: no scleral icterus, no conjunctival injection  Ears: normal external auditory canal, no retraction or bulging of tympanic membranes  Neck: no thyromegaly  Cardiovascular: regular rate and rhythm, no leg edema  Respiratory: normal respiratory effort, clear to auscultation bilaterally  Musculoskeletal: No gross deformities appreciated  Neurologic: Alert, CNs II-XII grossly intact..  Psych: Appropriate mood and affect.        Assessment/Plan   Problem List Items Addressed This Visit       Generalized anxiety " disorder - Primary     Resolved  Decrease hydroxizine to BID x 3 days then 1 a day x 3 days then stop          Other Visit Diagnoses       Screening for diabetes mellitus        Relevant Orders    Hemoglobin A1C    Screening for cholesterol level        Relevant Orders    Lipid Panel          All pertinent lab work and results were reviewed with patient.     Follow up as scheduled for yearly physical. Labs ordered for that visit.     Lori Xavier, DO

## 2024-04-08 NOTE — PATIENT INSTRUCTIONS
Thank you for choosing [facility] for your healthcare.   As always if you have any questions or concerns please do not hesitate to call our office at [phone] or through [employer].    Have a great day!  Lori Xavier, DO

## 2024-09-06 ENCOUNTER — LAB (OUTPATIENT)
Dept: LAB | Facility: LAB | Age: 49
End: 2024-09-06
Payer: COMMERCIAL

## 2024-09-06 DIAGNOSIS — Z13.1 SCREENING FOR DIABETES MELLITUS: ICD-10-CM

## 2024-09-06 DIAGNOSIS — Z13.220 SCREENING FOR CHOLESTEROL LEVEL: ICD-10-CM

## 2024-09-06 LAB
CHOLEST SERPL-MCNC: 159 MG/DL (ref 0–199)
CHOLESTEROL/HDL RATIO: 2.3
EST. AVERAGE GLUCOSE BLD GHB EST-MCNC: 126 MG/DL
HBA1C MFR BLD: 6 %
HDLC SERPL-MCNC: 69.7 MG/DL
LDLC SERPL CALC-MCNC: 80 MG/DL
NON HDL CHOLESTEROL: 89 MG/DL (ref 0–149)
TRIGL SERPL-MCNC: 47 MG/DL (ref 0–149)
VLDL: 9 MG/DL (ref 0–40)

## 2024-09-06 PROCEDURE — 83036 HEMOGLOBIN GLYCOSYLATED A1C: CPT

## 2024-09-06 PROCEDURE — 36415 COLL VENOUS BLD VENIPUNCTURE: CPT

## 2024-09-06 PROCEDURE — 80061 LIPID PANEL: CPT

## 2024-09-20 NOTE — PATIENT INSTRUCTIONS
Thank you for choosing [facility] for your healthcare.   As always if you have any questions or concerns please do not hesitate to call our office at [phone] or through [employer].    Have a great day!  Lori Xavier, DO   
Yes-Patient/Caregiver accepts free interpretation services...

## 2024-09-23 ENCOUNTER — OFFICE VISIT (OUTPATIENT)
Dept: PRIMARY CARE | Facility: CLINIC | Age: 49
End: 2024-09-23
Payer: COMMERCIAL

## 2024-09-23 ENCOUNTER — APPOINTMENT (OUTPATIENT)
Dept: PRIMARY CARE | Facility: CLINIC | Age: 49
End: 2024-09-23
Payer: COMMERCIAL

## 2024-09-23 VITALS
DIASTOLIC BLOOD PRESSURE: 74 MMHG | SYSTOLIC BLOOD PRESSURE: 114 MMHG | RESPIRATION RATE: 18 BRPM | HEIGHT: 76 IN | OXYGEN SATURATION: 99 % | WEIGHT: 168 LBS | BODY MASS INDEX: 20.46 KG/M2 | HEART RATE: 74 BPM | TEMPERATURE: 96.8 F

## 2024-09-23 DIAGNOSIS — Z23 NEED FOR INFLUENZA VACCINATION: ICD-10-CM

## 2024-09-23 DIAGNOSIS — Z00.00 ANNUAL PHYSICAL EXAM: Primary | ICD-10-CM

## 2024-09-23 DIAGNOSIS — R73.03 PREDIABETES: ICD-10-CM

## 2024-09-23 PROBLEM — F41.1 GENERALIZED ANXIETY DISORDER: Status: RESOLVED | Noted: 2024-02-13 | Resolved: 2024-09-23

## 2024-09-23 PROCEDURE — 90471 IMMUNIZATION ADMIN: CPT | Performed by: FAMILY MEDICINE

## 2024-09-23 PROCEDURE — 90656 IIV3 VACC NO PRSV 0.5 ML IM: CPT | Performed by: FAMILY MEDICINE

## 2024-09-23 PROCEDURE — 1036F TOBACCO NON-USER: CPT | Performed by: FAMILY MEDICINE

## 2024-09-23 PROCEDURE — 99396 PREV VISIT EST AGE 40-64: CPT | Performed by: FAMILY MEDICINE

## 2024-09-23 PROCEDURE — 3008F BODY MASS INDEX DOCD: CPT | Performed by: FAMILY MEDICINE

## 2024-09-23 SDOH — ECONOMIC STABILITY: FOOD INSECURITY: WITHIN THE PAST 12 MONTHS, YOU WORRIED THAT YOUR FOOD WOULD RUN OUT BEFORE YOU GOT MONEY TO BUY MORE.: NEVER TRUE

## 2024-09-23 SDOH — ECONOMIC STABILITY: FOOD INSECURITY: WITHIN THE PAST 12 MONTHS, THE FOOD YOU BOUGHT JUST DIDN'T LAST AND YOU DIDN'T HAVE MONEY TO GET MORE.: NEVER TRUE

## 2024-09-23 ASSESSMENT — ENCOUNTER SYMPTOMS
DEPRESSION: 0
LOSS OF SENSATION IN FEET: 0
OCCASIONAL FEELINGS OF UNSTEADINESS: 0

## 2024-09-23 ASSESSMENT — LIFESTYLE VARIABLES
HOW OFTEN DO YOU HAVE SIX OR MORE DRINKS ON ONE OCCASION: NEVER
HOW MANY STANDARD DRINKS CONTAINING ALCOHOL DO YOU HAVE ON A TYPICAL DAY: PATIENT DOES NOT DRINK
AUDIT-C TOTAL SCORE: 0
HOW OFTEN DO YOU HAVE A DRINK CONTAINING ALCOHOL: NEVER
SKIP TO QUESTIONS 9-10: 1

## 2024-09-23 ASSESSMENT — PAIN SCALES - GENERAL: PAINLEVEL: 0-NO PAIN

## 2024-09-23 NOTE — ASSESSMENT & PLAN NOTE
Cut out energy drinks and sugary coffee  Eating more vegetables and fruits  Recheck in 3 months.  Orders:    Hemoglobin A1C; Future

## 2024-09-23 NOTE — PROGRESS NOTES
"Subjective   Patient ID: Anjel Rodriguez is a 48 y.o. male who presents for Annual Exam.  No new problems or concerns.        In addition to that documented in the HPI above, the additional ROS was obtained:  Constitutional: Denies fevers or chills  Eyes: Denies vision changes  ENMT: Denies trouble swallowing  Cardiovascular: Denies chest pain or heart racing  Respiratory: Denies SOB or cough  Gastrointestinal: Denies nausea, vomiting, diarrhea or constipation  Musculoskeletal: Denies joint pain or joint swelling  Neuro: denies frequent headaches or dizziness  Psych: denies depression or anxiety      Patient Care Team:  Lori Xavier DO as PCP - General (Family Medicine)  Lori Xavier DO as PCP - Employee ACO PCP  Giles Daily MD as Consulting Physician (Interventional Cardiology)  Demond Buckner MD as Consulting Physician (Rheumatology)  Amaris Fernandez MD as Consulting Physician (Neurology)    Current Outpatient Medications   Medication Sig Dispense Refill    aspirin 81 mg chewable tablet Chew 1 tablet (81 mg) once daily.       No current facility-administered medications for this visit.       Objective     Visit Vitals  /74 (BP Location: Left arm, Patient Position: Sitting, BP Cuff Size: Adult)   Pulse 74   Temp 36 °C (96.8 °F) (Temporal)   Resp 18   Ht 1.93 m (6' 4\")   Wt 76.2 kg (168 lb)   SpO2 99%   BMI 20.45 kg/m²   Smoking Status Never   BSA 2.02 m²        Constitutional: Well nourished, well developed, appears stated age  Eyes: no scleral icterus, no conjunctival injection  Ears: normal external auditory canal, no retraction or bulging of tympanic membranes  Neck: no thyromegaly  Cardiovascular: regular rate and rhythm, no leg edema  Respiratory: normal respiratory effort, clear to auscultation bilaterally  Musculoskeletal: No gross deformities appreciated  Skin: Warm, dry. No rashes  Neurologic: Alert, CNs II-XII grossly intact..  Psych: Appropriate mood and affect.        Assessment & " Plan  Annual physical exam  Screening labs reviewed    Orders:    Follow Up In Primary Care - Health Maintenance; Future    Prediabetes  Cut out energy drinks and sugary coffee  Eating more vegetables and fruits  Recheck in 3 months.  Orders:    Hemoglobin A1C; Future    Need for influenza vaccination    Orders:    Flu vaccine, trivalent, preservative free, age 6 months and greater (Fluraix/Fluzone/Flulaval)       All pertinent lab work and results were reviewed with patient.     Follow up yearly and prn.    Lori Xavier DO

## 2024-12-30 ENCOUNTER — TELEPHONE (OUTPATIENT)
Dept: PRIMARY CARE | Facility: CLINIC | Age: 49
End: 2024-12-30
Payer: COMMERCIAL

## 2024-12-30 NOTE — TELEPHONE ENCOUNTER
Patient phoned and wants to know if it is ok to take OTC powder Omega 3.  His concern is with blood thinner.  Please advise.

## 2024-12-30 NOTE — TELEPHONE ENCOUNTER
It is okay for him to take omega-3 since he is currently only on a low-dose aspirin. Thanks,  Lori Xavier, DO

## 2025-02-14 LAB
EST. AVERAGE GLUCOSE BLD GHB EST-MCNC: 111 MG/DL
EST. AVERAGE GLUCOSE BLD GHB EST-SCNC: 6.2 MMOL/L
HBA1C MFR BLD: 5.5 % OF TOTAL HGB

## 2025-09-23 ENCOUNTER — APPOINTMENT (OUTPATIENT)
Dept: PRIMARY CARE | Facility: CLINIC | Age: 50
End: 2025-09-23
Payer: COMMERCIAL